# Patient Record
(demographics unavailable — no encounter records)

---

## 2024-10-17 NOTE — ASSESSMENT
[FreeTextEntry1] :  1. Acne - chronic; flaring Failed topicals and PO doxycycline Ipledge registration # see scanned .  Weight: 155lbs or 70.5kg Cumulative dose: 2400mg or 34mg/kg Finished month: 2 (gap in treatment since May 2024) Contraception: n/a - Increase isotretinoin to 80mg once daily to start month 3. Take with a large meal - Re-registered in Ipledge 8/15/24  - Pt instructed to STOP taking any other topical or antibiotic acne treatments (while on Accutane).  Also advised patient to stop any OTC workout supplements (creatine, whey protein) if applicable.  - Discussed side effects including xerosis: recommend Aquaphor to lips prn, intranasally for mucosal dryness/nose bleeds, and Natural Tears without vasoconstrictors as needed. Other specific side effects of Accutane include: headache, blurry vision, dizziness, joint or muscle stiffness, skin rash, arthralgias, weakened or broken bones, liver damage, IBD, depression, moodiness, potential suicidal ideations, and requirement for in office f/u monthly. Explained to patient that he/she cannot get waxing or tattoos on skin while on this medication or 6 months following completion of therapy. Discussed certain SE of birth defects associated with isotretinoin. Discussed need for pregnancy prevention and regular pregnancy monitoring; recommend 2 forms of birth control (or abstinence) for 1 month following completion of therapy. Additionally, patient was instructed that sun exposure, Vitamin A supplements, other oral antibiotics, sharing their medication with others, and donating blood products should all be avoided (during therapy and until 1 month after completion of therapy). Patient verbalizes feedback/understanding of all.   2. Encounter for longterm use of high risk medication - Reviewed baseline labs May 2024: LFTs, TGs WNL  - Check labs prior to next visit: LFTs, TGs (fasting)  RTC 1 month

## 2024-10-17 NOTE — PHYSICAL EXAM
[Alert] : alert [Oriented x 3] : ~L oriented x 3 [Well Nourished] : well nourished [Conjunctiva Non-injected] : conjunctiva non-injected [No Visual Lymphadenopathy] : no visual  lymphadenopathy [No Clubbing] : no clubbing [No Edema] : no edema [No Bromhidrosis] : no bromhidrosis [No Chromhidrosis] : no chromhidrosis [Declined] : declined [FreeTextEntry3] : + pink cystic nodules on bl cheeks; atrophic scars and pitted scars on bl cheeks

## 2024-10-17 NOTE — HISTORY OF PRESENT ILLNESS
[FreeTextEntry1] : acne [de-identified] : NIGHAT LAI is a 17 year old M who present for f/u as below.   #Acne on face, xyears Failed topicals and doxycycline - April 2024: accutane 40mg daily - May 2024: increased to accutane 40mg BID, but patient never got the RX from [facility] - Aug 15th 2024: only s/p 1 month so far.  - Sept 12, 2024: never got the medication (needs PA) - 10/17/24: tolerating 40mg daily for month 2, lip dryness.   Denies hx of depression, IBD. Personal hx of skin cancer: no FHx of skin cancer: no Social hx: senior in HS; going to trade school

## 2025-03-11 NOTE — PHYSICAL EXAM
[de-identified] : General: Well appearing, no acute distress Neurologic: A&Ox3, No focal deficits Head: NCAT without abrasions, lacerations, or ecchymosis to head, face, or scalp Eyes: No scleral icterus, no gross abnormalities Respiratory: Equal chest wall expansion bilaterally, no accessory muscle use Lymphatic: No lymphadenopathy palpated Skin: Warm and dry Psychiatric: Normal mood and affect  The left knee is examined and reveals swelling over proximal tibia with associated ecchymosis. No erythema, or deformity. The patients range of motion is from 0-130 degrees pain free and fluid. There is no crepitus felt. Tender over the pes anserine. The patient has no tenderness to palpation in the medial or lateral joint lines. There is no patellar facet tenderness. The patient has 5/5 strength to resisted hip flexion, VMO isolation, knee flexion and extension. The patient is stable to anterior and posterior draw. The patient is stable to Lochman testing. There is no valgus or varus ligamentous instability. Negative Gisell and Grind tests. There is no pain with patellar mobility or apprehension testing. The calf and thigh are soft, supple, and nontender. The patient is grossly neurovascularly intact distally. [de-identified] : The following radiographs were ordered and read by me during this patient's visit. I reviewed each radiograph in detail with the patient and discussed the findings as highlighted below. 4 views of the left knee were obtained today that show no fracture, dislocation. There is no degenerative change seen. There is no malalignment. No obvious osseous abnormality. Otherwise unremarkable.

## 2025-03-11 NOTE — HISTORY OF PRESENT ILLNESS
[de-identified] : NIGHAT is a 17 year male here today for initial visit due to L knee. He reports that he was doing an oil change and when he went to take out the oil pan he states that he slammed his knee into something causing pain. He points to his medial knee as the source of his pain. 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 He also reports that anytime he does activity his L knee swells. Presents today with his mother.

## 2025-03-11 NOTE — PHYSICAL EXAM
[de-identified] : General: Well appearing, no acute distress Neurologic: A&Ox3, No focal deficits Head: NCAT without abrasions, lacerations, or ecchymosis to head, face, or scalp Eyes: No scleral icterus, no gross abnormalities Respiratory: Equal chest wall expansion bilaterally, no accessory muscle use Lymphatic: No lymphadenopathy palpated Skin: Warm and dry Psychiatric: Normal mood and affect  The left knee is examined and reveals swelling over proximal tibia with associated ecchymosis. No erythema, or deformity. The patients range of motion is from 0-130 degrees pain free and fluid. There is no crepitus felt. Tender over the pes anserine. The patient has no tenderness to palpation in the medial or lateral joint lines. There is no patellar facet tenderness. The patient has 5/5 strength to resisted hip flexion, VMO isolation, knee flexion and extension. The patient is stable to anterior and posterior draw. The patient is stable to Lochman testing. There is no valgus or varus ligamentous instability. Negative Gisell and Grind tests. There is no pain with patellar mobility or apprehension testing. The calf and thigh are soft, supple, and nontender. The patient is grossly neurovascularly intact distally. [de-identified] : The following radiographs were ordered and read by me during this patient's visit. I reviewed each radiograph in detail with the patient and discussed the findings as highlighted below. 4 views of the left knee were obtained today that show no fracture, dislocation. There is no degenerative change seen. There is no malalignment. No obvious osseous abnormality. Otherwise unremarkable.

## 2025-03-11 NOTE — HISTORY OF PRESENT ILLNESS
[de-identified] : NIGHAT is a 17 year male here today for initial visit due to L knee. He reports that he was doing an oil change and when he went to take out the oil pan he states that he slammed his knee into something causing pain. He points to his medial knee as the source of his pain. 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 He also reports that anytime he does activity his L knee swells. Presents today with his mother.

## 2025-03-11 NOTE — DISCUSSION/SUMMARY
[de-identified] : We had a thorough discussion regarding the nature of his pain, the pathophysiology, as well as all treatment options. I discussed operative and non-operative treatment modalities. A prescription of Naproxen was given to be taken as directed with food to prevent GI upset, if occurs pt to D/C and call us at that time. 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 Also recommend compression knee sleeve for swelling. All questions were answered and the patient verbalized understanding. The patient is in agreement with this treatment plan. Patient will follow up in 6-8 wks for repeat clinical assessment.

## 2025-03-11 NOTE — ADDENDUM
[FreeTextEntry1] : Documented by Amanda Baltazar acting as a scribe for Dr. Meza and Ambrocio Samson PA-C on 03/11/2025. All medical record entries made by the Scribe were at my, Dr. Meza, and Ambrocio Samson's, direction and personally dictated by me on 03/11/2025. I have reviewed the chart and agree that the record accurately reflects my personal performance of the history, physical exam, procedure and imaging.

## 2025-03-11 NOTE — DISCUSSION/SUMMARY
[de-identified] : We had a thorough discussion regarding the nature of his pain, the pathophysiology, as well as all treatment options. I discussed operative and non-operative treatment modalities. A prescription of Naproxen was given to be taken as directed with food to prevent GI upset, if occurs pt to D/C and call us at that time. 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 Also recommend compression knee sleeve for swelling. All questions were answered and the patient verbalized understanding. The patient is in agreement with this treatment plan. Patient will follow up in 6-8 wks for repeat clinical assessment.

## 2025-03-11 NOTE — CONSULT LETTER
[Dear  ___] : Dear  [unfilled], [Consult Letter:] : I had the pleasure of evaluating your patient, [unfilled]. [Please see my note below.] : Please see my note below. [Sincerely,] : Sincerely, [FreeTextEntry3] : Dr. Slava Meza